# Patient Record
Sex: MALE | Race: WHITE | NOT HISPANIC OR LATINO | Employment: OTHER | URBAN - NONMETROPOLITAN AREA
[De-identification: names, ages, dates, MRNs, and addresses within clinical notes are randomized per-mention and may not be internally consistent; named-entity substitution may affect disease eponyms.]

---

## 2020-08-24 ENCOUNTER — OFFICE VISIT (OUTPATIENT)
Dept: SURGERY | Facility: CLINIC | Age: 80
End: 2020-08-24

## 2020-08-24 VITALS
BODY MASS INDEX: 22.68 KG/M2 | HEIGHT: 71 IN | SYSTOLIC BLOOD PRESSURE: 136 MMHG | WEIGHT: 162 LBS | DIASTOLIC BLOOD PRESSURE: 74 MMHG | TEMPERATURE: 98.3 F | HEART RATE: 60 BPM | RESPIRATION RATE: 16 BRPM

## 2020-08-24 DIAGNOSIS — R12 HEARTBURN: ICD-10-CM

## 2020-08-24 DIAGNOSIS — R13.12 OROPHARYNGEAL DYSPHAGIA: Primary | ICD-10-CM

## 2020-08-24 PROCEDURE — 99203 OFFICE O/P NEW LOW 30 MIN: CPT | Performed by: SURGERY

## 2020-08-24 RX ORDER — AZITHROMYCIN 500 MG/1
500 TABLET, FILM COATED ORAL DAILY
COMMUNITY
End: 2021-08-10

## 2020-08-24 RX ORDER — LEVOTHYROXINE SODIUM 0.05 MG/1
75 TABLET ORAL DAILY
COMMUNITY
End: 2021-08-30 | Stop reason: ALTCHOICE

## 2020-08-24 RX ORDER — OMEPRAZOLE 20 MG/1
40 CAPSULE, DELAYED RELEASE ORAL DAILY
COMMUNITY

## 2020-08-24 RX ORDER — ETHAMBUTOL HYDROCHLORIDE 400 MG/1
400 TABLET, FILM COATED ORAL DAILY
COMMUNITY
End: 2021-08-10

## 2020-08-24 RX ORDER — RIFAMPIN 300 MG/1
600 CAPSULE ORAL
COMMUNITY
End: 2021-08-10

## 2020-08-24 RX ORDER — ATORVASTATIN CALCIUM 20 MG/1
20 TABLET, FILM COATED ORAL DAILY
COMMUNITY

## 2020-08-24 RX ORDER — INDOMETHACIN 50 MG/1
50 CAPSULE ORAL 3 TIMES DAILY PRN
COMMUNITY
End: 2021-03-01

## 2020-08-24 RX ORDER — RAMIPRIL 5 MG/1
5 CAPSULE ORAL DAILY
COMMUNITY

## 2020-08-24 NOTE — H&P
Amol Brown 80 y.o. male presents @ the req of Dr. Todd for eval of diff swallowing solids since March 2020.    Chief Complaint   Patient presents with   • EGD     diff swallowing             HPI   Above-noted agree.  This very pleasant 80-year-old is here to discuss an EGD.  He is having difficulty swallowing.  He feels like there is a full sensation in his throat every time he swallows.  He does have issues with chronic heartburn as well.  He does not get sick to his stomach or vomit.  He moves his bowels daily.  He does have a history of colon polyps but his next colonoscopy is not due until October 2021.  He has no abdominal pain.  He has no chest pain or shortness of breath.  He does not smoke or use tobacco products.  He has no other complaints.      Review of Systems   All other systems reviewed and are negative.            Current Outpatient Medications:   •  atorvastatin (LIPITOR) 20 MG tablet, Take 20 mg by mouth Daily., Disp: , Rfl:   •  azithromycin (ZITHROMAX) 500 MG tablet, Take 500 mg by mouth Daily., Disp: , Rfl:   •  ethambutol (MYAMBUTOL) 400 MG tablet, Take 400 mg by mouth Daily., Disp: , Rfl:   •  indomethacin (INDOCIN) 50 MG capsule, Take 50 mg by mouth 3 (Three) Times a Day As Needed for Mild Pain ., Disp: , Rfl:   •  levothyroxine (SYNTHROID, LEVOTHROID) 50 MCG tablet, Take 50 mcg by mouth Daily., Disp: , Rfl:   •  omeprazole (priLOSEC) 20 MG capsule, Take 20 mg by mouth Daily., Disp: , Rfl:   •  ramipril (ALTACE) 5 MG capsule, Take 5 mg by mouth Daily., Disp: , Rfl:   •  rifAMPin (RIFADIN) 300 MG capsule, Take 600 mg by mouth., Disp: , Rfl:         No Known Allergies        Past Medical History:   Diagnosis Date   • Arthritis    • CAD (coronary artery disease)    • Colon polyps    • HLD (hyperlipidemia)    • Thoracic aortic aneurysm (CMS/HCC)    • Thyroid disease            Past Surgical History:   Procedure Laterality Date   • COLONOSCOPY     • CORONARY ANGIOPLASTY WITH STENT PLACEMENT   "   • ENDOSCOPY     • NECK SURGERY             Social History     Tobacco Use   • Smoking status: Former Smoker   • Smokeless tobacco: Never Used   Substance Use Topics   • Alcohol use: Not Currently   • Drug use: Not on file             There is no immunization history on file for this patient.        Physical Exam   Constitutional: He is oriented to person, place, and time. He appears well-developed and well-nourished.   HENT:   Head: Normocephalic and atraumatic.   Right Ear: External ear normal.   Left Ear: External ear normal.   Cardiovascular: Normal rate and regular rhythm.   Pulmonary/Chest: Effort normal and breath sounds normal.   Abdominal: Soft. Bowel sounds are normal.   Musculoskeletal: He exhibits no edema or deformity.   Neurological: He is alert and oriented to person, place, and time.   Skin: Skin is warm and dry.   Psychiatric: He has a normal mood and affect. His behavior is normal.   Nursing note and vitals reviewed.      Debilities/Disabilities Identified: None    Emotional Behavior: Appropriate      /74   Pulse 60   Temp 98.3 °F (36.8 °C)   Resp 16   Ht 180.3 cm (71\")   Wt 73.5 kg (162 lb)   BMI 22.59 kg/m²          Amol was seen today for egd.    Diagnoses and all orders for this visit:    Oropharyngeal dysphagia    Heartburn    We will get Amol scheduled for an EGD.  I discussed with the patient the benefits and risks of performing endoscopy.  Benefits and risks were not limited to but including bleeding, infection, perforation, complications of anesthesia, aspiration.  The patient appeared to understand and is willing to proceed.    Thank you for allowing me to participate in the care of this interesting patient.              "

## 2020-09-05 ENCOUNTER — APPOINTMENT (OUTPATIENT)
Dept: LAB | Facility: HOSPITAL | Age: 80
End: 2020-09-05

## 2020-09-11 ENCOUNTER — TRANSCRIBE ORDERS (OUTPATIENT)
Dept: ADMINISTRATIVE | Facility: HOSPITAL | Age: 80
End: 2020-09-11

## 2020-09-11 DIAGNOSIS — U07.1 COVID-19: Primary | ICD-10-CM

## 2020-09-12 ENCOUNTER — LAB (OUTPATIENT)
Dept: LAB | Facility: HOSPITAL | Age: 80
End: 2020-09-12

## 2020-09-12 DIAGNOSIS — U07.1 COVID-19: ICD-10-CM

## 2020-09-12 PROCEDURE — C9803 HOPD COVID-19 SPEC COLLECT: HCPCS

## 2020-09-12 PROCEDURE — U0004 COV-19 TEST NON-CDC HGH THRU: HCPCS

## 2020-09-14 ENCOUNTER — ANESTHESIA EVENT (OUTPATIENT)
Dept: PERIOP | Facility: HOSPITAL | Age: 80
End: 2020-09-14

## 2020-09-14 LAB — SARS-COV-2 RNA NOSE QL NAA+PROBE: NOT DETECTED

## 2020-09-15 ENCOUNTER — HOSPITAL ENCOUNTER (OUTPATIENT)
Facility: HOSPITAL | Age: 80
Setting detail: HOSPITAL OUTPATIENT SURGERY
Discharge: HOME | End: 2020-09-15
Attending: SURGERY | Admitting: SURGERY

## 2020-09-15 ENCOUNTER — ANESTHESIA (OUTPATIENT)
Dept: PERIOP | Facility: HOSPITAL | Age: 80
End: 2020-09-15

## 2020-09-15 VITALS
WEIGHT: 166.4 LBS | HEIGHT: 71 IN | TEMPERATURE: 98 F | HEART RATE: 56 BPM | BODY MASS INDEX: 23.3 KG/M2 | SYSTOLIC BLOOD PRESSURE: 128 MMHG | DIASTOLIC BLOOD PRESSURE: 73 MMHG | RESPIRATION RATE: 16 BRPM | OXYGEN SATURATION: 98 %

## 2020-09-15 DIAGNOSIS — R12 HEARTBURN: ICD-10-CM

## 2020-09-15 DIAGNOSIS — R13.12 OROPHARYNGEAL DYSPHAGIA: ICD-10-CM

## 2020-09-15 PROCEDURE — 43239 EGD BIOPSY SINGLE/MULTIPLE: CPT | Performed by: SURGERY

## 2020-09-15 PROCEDURE — 88342 IMHCHEM/IMCYTCHM 1ST ANTB: CPT | Performed by: SURGERY

## 2020-09-15 PROCEDURE — 25010000002 PROPOFOL 10 MG/ML EMULSION: Performed by: NURSE ANESTHETIST, CERTIFIED REGISTERED

## 2020-09-15 PROCEDURE — 88312 SPECIAL STAINS GROUP 1: CPT | Performed by: SURGERY

## 2020-09-15 PROCEDURE — S0260 H&P FOR SURGERY: HCPCS | Performed by: SURGERY

## 2020-09-15 PROCEDURE — 88305 TISSUE EXAM BY PATHOLOGIST: CPT | Performed by: SURGERY

## 2020-09-15 PROCEDURE — 87081 CULTURE SCREEN ONLY: CPT | Performed by: SURGERY

## 2020-09-15 RX ORDER — LIDOCAINE HYDROCHLORIDE 10 MG/ML
INJECTION, SOLUTION EPIDURAL; INFILTRATION; INTRACAUDAL; PERINEURAL AS NEEDED
Status: DISCONTINUED | OUTPATIENT
Start: 2020-09-15 | End: 2020-09-15 | Stop reason: SURG

## 2020-09-15 RX ORDER — SODIUM CHLORIDE 0.9 % (FLUSH) 0.9 %
10 SYRINGE (ML) INJECTION AS NEEDED
Status: DISCONTINUED | OUTPATIENT
Start: 2020-09-15 | End: 2020-09-15 | Stop reason: HOSPADM

## 2020-09-15 RX ORDER — SODIUM CHLORIDE 0.9 % (FLUSH) 0.9 %
10 SYRINGE (ML) INJECTION EVERY 12 HOURS SCHEDULED
Status: DISCONTINUED | OUTPATIENT
Start: 2020-09-15 | End: 2020-09-15 | Stop reason: HOSPADM

## 2020-09-15 RX ORDER — PROPOFOL 10 MG/ML
VIAL (ML) INTRAVENOUS CONTINUOUS PRN
Status: DISCONTINUED | OUTPATIENT
Start: 2020-09-15 | End: 2020-09-15 | Stop reason: SURG

## 2020-09-15 RX ORDER — ASPIRIN 81 MG/1
81 TABLET ORAL DAILY
COMMUNITY

## 2020-09-15 RX ORDER — SUCRALFATE 1 G/1
1 TABLET ORAL 4 TIMES DAILY
Qty: 120 TABLET | Refills: 1 | Status: SHIPPED | OUTPATIENT
Start: 2020-09-15 | End: 2021-03-01

## 2020-09-15 RX ORDER — LIDOCAINE HYDROCHLORIDE 10 MG/ML
0.5 INJECTION, SOLUTION EPIDURAL; INFILTRATION; INTRACAUDAL; PERINEURAL ONCE AS NEEDED
Status: COMPLETED | OUTPATIENT
Start: 2020-09-15 | End: 2020-09-15

## 2020-09-15 RX ORDER — PROPOFOL 10 MG/ML
VIAL (ML) INTRAVENOUS AS NEEDED
Status: DISCONTINUED | OUTPATIENT
Start: 2020-09-15 | End: 2020-09-15 | Stop reason: SURG

## 2020-09-15 RX ORDER — SODIUM CHLORIDE 9 MG/ML
40 INJECTION, SOLUTION INTRAVENOUS AS NEEDED
Status: DISCONTINUED | OUTPATIENT
Start: 2020-09-15 | End: 2020-09-15 | Stop reason: HOSPADM

## 2020-09-15 RX ORDER — SODIUM CHLORIDE, SODIUM LACTATE, POTASSIUM CHLORIDE, CALCIUM CHLORIDE 600; 310; 30; 20 MG/100ML; MG/100ML; MG/100ML; MG/100ML
9 INJECTION, SOLUTION INTRAVENOUS CONTINUOUS PRN
Status: DISCONTINUED | OUTPATIENT
Start: 2020-09-15 | End: 2020-09-15 | Stop reason: HOSPADM

## 2020-09-15 RX ADMIN — PROPOFOL 30 MG: 10 INJECTION, EMULSION INTRAVENOUS at 09:52

## 2020-09-15 RX ADMIN — PROPOFOL 50 MG: 10 INJECTION, EMULSION INTRAVENOUS at 09:48

## 2020-09-15 RX ADMIN — SODIUM CHLORIDE, POTASSIUM CHLORIDE, SODIUM LACTATE AND CALCIUM CHLORIDE: 600; 310; 30; 20 INJECTION, SOLUTION INTRAVENOUS at 09:42

## 2020-09-15 RX ADMIN — SODIUM CHLORIDE, POTASSIUM CHLORIDE, SODIUM LACTATE AND CALCIUM CHLORIDE 9 ML/HR: 600; 310; 30; 20 INJECTION, SOLUTION INTRAVENOUS at 09:15

## 2020-09-15 RX ADMIN — PROPOFOL 50 MG: 10 INJECTION, EMULSION INTRAVENOUS at 09:45

## 2020-09-15 RX ADMIN — LIDOCAINE HYDROCHLORIDE 50 MG: 10 INJECTION, SOLUTION EPIDURAL; INFILTRATION; INTRACAUDAL; PERINEURAL at 09:44

## 2020-09-15 RX ADMIN — PROPOFOL 50 MCG/KG/MIN: 10 INJECTION, EMULSION INTRAVENOUS at 09:45

## 2020-09-15 RX ADMIN — LIDOCAINE HYDROCHLORIDE 0.5 ML: 10 INJECTION, SOLUTION EPIDURAL; INFILTRATION; INTRACAUDAL; PERINEURAL at 09:15

## 2020-09-15 NOTE — ANESTHESIA POSTPROCEDURE EVALUATION
Patient: Amol Brown    Procedure Summary     Date: 09/15/20 Room / Location: [facility] ENDOSCOPY 1 /  [facility]    Anesthesia Start: 0942 Anesthesia Stop: 0956    Procedure: Esophagogastroduodenoscopy with biopsies, polypectomy (N/A Esophagus) Diagnosis:       Oropharyngeal dysphagia      Heartburn      (Oropharyngeal dysphagia [R13.12])      (Heartburn [R12])    Surgeon: Ada Hendricks DO Provider: Rahat Monreal CRNA    Anesthesia Type: MAC ASA Status: 2          Anesthesia Type: MAC    Vitals  Vitals Value Taken Time   /67 09/15/20 1000   Temp     Pulse 52 09/15/20 1000   Resp 12 09/15/20 1000   SpO2 97 % 09/15/20 1000           Post Anesthesia Care and Evaluation    Patient location during evaluation: bedside  Patient participation: complete - patient participated  Level of consciousness: awake and alert  Pain score: 0  Pain management: adequate  Airway patency: patent  Anesthetic complications: No anesthetic complications  PONV Status: none  Cardiovascular status: acceptable  Respiratory status: acceptable  Hydration status: acceptable  No anesthesia care post op

## 2020-09-15 NOTE — OP NOTE
EGD Procedure Note    Pre-operative Diagnosis:  dysphagia    Post-operative Diagnosis: Esophagitis, gastritis, duodenitis, gastric polyps    Procedure:  EGD with biopsies with cold forceps    Surgeon: Eladio    Estimated Blood Loss: Minimal    Complications: None    Anesthetic: Rahat Huerta CRNA    Indications: See preoperative diagnosis    Findings/Treatments:    Esophagitis-biopsies of GE junction with cold forceps  Gastritis-biopsy for H. pylori with cold forceps  Gastric polyps-biopsied with cold forceps  Duodenitis-biopsy with cold forceps    Recommendations:  -Await pathology.      Technique:    After discussing the benefits and risks of an EGD, benefits and risks not limited to but including:  Bleeding, infection, perforation, aspiration; informed consent was signed.  The patient was taken into the endoscopy suite at [facility] and placed in the left lateral decubitus position.  MAC anesthesia was induced under appropriate monitoring.  Bite block was placed and the gastroscope was inserted thru such and advanced under direct vision to second portion of the duodenum.  A careful inspection was made as the gastroscope was withdrawn, including a retroflexed view of the proximal stomach; findings and interventions are described below.  If biopsies were taken, this was done with the cold biopsy forceps.    Ada Hendricks D.O.

## 2020-09-15 NOTE — ANESTHESIA PREPROCEDURE EVALUATION
Anesthesia Evaluation     Patient summary reviewed and Nursing notes reviewed   no history of anesthetic complications:  NPO Solid Status: > 8 hours  NPO Liquid Status: > 8 hours           Airway   Mallampati: II  Dental - normal exam         Pulmonary - normal exam   (+) a smoker Former,   Cardiovascular - normal exam  Exercise tolerance: good (4-7 METS)    ECG reviewed  Rhythm: regular  Rate: normal    (+) hypertension well controlled, CAD (stent 20yrs ago), hyperlipidemia,       Neuro/Psych- negative ROS  GI/Hepatic/Renal/Endo    (+)   thyroid problem hypothyroidism    Musculoskeletal     (+) back pain (laminectomy 2014),   Abdominal    Substance History - negative use     OB/GYN negative ob/gyn ROS         Other   arthritis,                    Anesthesia Plan    ASA 2     MAC     intravenous induction     Anesthetic plan, all risks, benefits, and alternatives have been provided, discussed and informed consent has been obtained with: patient.  Use of blood products discussed with patient  Consented to blood products.

## 2020-09-16 LAB — UREASE TISS QL: NEGATIVE

## 2020-09-17 LAB
CYTO UR: NORMAL
LAB AP CASE REPORT: NORMAL
PATH REPORT.FINAL DX SPEC: NORMAL
PATH REPORT.GROSS SPEC: NORMAL

## 2020-09-21 ENCOUNTER — OFFICE VISIT (OUTPATIENT)
Dept: SURGERY | Facility: CLINIC | Age: 80
End: 2020-09-21

## 2020-09-21 VITALS — TEMPERATURE: 97.3 F

## 2020-09-21 DIAGNOSIS — K21.00 GASTROESOPHAGEAL REFLUX DISEASE WITH ESOPHAGITIS: Primary | ICD-10-CM

## 2020-09-21 PROCEDURE — 99213 OFFICE O/P EST LOW 20 MIN: CPT | Performed by: SURGERY

## 2020-09-21 NOTE — PROGRESS NOTES
Amol Brown 80 y.o. male presents for PO FU EGD.  Pt states he feels about the same as before the proc.        HPI   Above-noted agree.  Amol is doing well.  His symptoms have really not changed.  He did have pretty severe reflux.  He does not want to take the Carafate right now because he is taking a lot of antibiotics for a lung infection and he is concerned that the Carafate will affect the absorption of those medications.  He has no abdominal pain.  He has no other symptoms.  He does not smoke or use tobacco products.  We reviewed his pictures and all of his pathology.      Review of Systems        Past Medical History:   Diagnosis Date   • Arthritis    • CAD (coronary artery disease)    • Colon polyps    • HLD (hyperlipidemia)    • Thoracic aortic aneurysm (CMS/HCC)    • Thyroid disease            Past Surgical History:   Procedure Laterality Date   • COLONOSCOPY     • CORONARY ANGIOPLASTY WITH STENT PLACEMENT     • ENDOSCOPY     • ENDOSCOPY N/A 9/15/2020    Procedure: Esophagogastroduodenoscopy with biopsies, polypectomy;  Surgeon: Ada Hendricks DO;  Location: [facility];  Service: Gastroenterology;  Laterality: N/A;  AUNDREA test  reflux esophagitis  mild gastritis  gastric polyp  duodenitis  GE junction biopsy  duodenum biopsy   • NECK SURGERY             Physical Exam  Vitals signs and nursing note reviewed.   Constitutional:       Appearance: Normal appearance.   Musculoskeletal:         General: No swelling or tenderness.   Skin:     General: Skin is warm and dry.   Neurological:      General: No focal deficit present.      Mental Status: He is alert and oriented to person, place, and time.   Psychiatric:         Mood and Affect: Mood normal.         Behavior: Behavior normal.             Temp 97.3 °F (36.3 °C)         Amol was seen today for post-op follow-up.    Diagnoses and all orders for this visit:    Gastroesophageal reflux disease with esophagitis    We discussed lifestyle changes and foods  to avoid for his reflux.  He is very knowledgeable about heartburn and he has done extensive research on his symptoms.  We did discuss that a referral to ENT but he declined at this time.  He may return to clinic anytime as needed.    Thank you for allowing me to participate in the care of this interesting patient.

## 2021-03-01 ENCOUNTER — OFFICE VISIT (OUTPATIENT)
Dept: SURGERY | Facility: CLINIC | Age: 81
End: 2021-03-01

## 2021-03-01 VITALS
RESPIRATION RATE: 16 BRPM | DIASTOLIC BLOOD PRESSURE: 76 MMHG | TEMPERATURE: 96.9 F | HEIGHT: 71 IN | HEART RATE: 72 BPM | SYSTOLIC BLOOD PRESSURE: 126 MMHG | WEIGHT: 166 LBS | BODY MASS INDEX: 23.24 KG/M2

## 2021-03-01 DIAGNOSIS — Z12.11 COLON CANCER SCREENING: Primary | ICD-10-CM

## 2021-03-01 PROBLEM — R13.12 OROPHARYNGEAL DYSPHAGIA: Status: RESOLVED | Noted: 2020-08-24 | Resolved: 2021-03-01

## 2021-03-01 PROBLEM — R12 HEARTBURN: Status: RESOLVED | Noted: 2020-08-24 | Resolved: 2021-03-01

## 2021-03-01 PROCEDURE — S0260 H&P FOR SURGERY: HCPCS | Performed by: SURGERY

## 2021-03-01 RX ORDER — PREDNISONE 1 MG/1
5 TABLET ORAL DAILY
COMMUNITY
End: 2021-08-10

## 2021-03-01 NOTE — H&P
Amol Brown 80 y.o. male presents as a self ref to discuss c-scope.  Pt states Dr. Todd told him he has a low RBC count.  Pt reports he is currently taking 3 antibiotics for a lung infection X 18 mos.   Chief Complaint   Patient presents with   • Anemia             HPI     Above-noted and agree.  Amol is known to my service.  I did an EGD on him several months ago for dysphagia.  All of his symptoms have since resolved.  He is here to discuss a colonoscopy.  He was told he has a low red blood cell count.  He did have a colonoscopy approximately 10 years ago that was normal.  He moves his bowels every day and does not see blood when he goes.  He is tolerating a regular diet without nausea or vomiting.  He has no chest pain or shortness of breath.  He has no complaints.    Review of Systems   All other systems reviewed and are negative.            Current Outpatient Medications:   •  predniSONE (DELTASONE) 5 MG tablet, Take 5 mg by mouth Daily., Disp: , Rfl:   •  ALLOPURINOL PO, Take  by mouth Daily., Disp: , Rfl:   •  aspirin 81 MG EC tablet, Take 81 mg by mouth Daily., Disp: , Rfl:   •  atorvastatin (LIPITOR) 20 MG tablet, Take 20 mg by mouth Daily., Disp: , Rfl:   •  azithromycin (ZITHROMAX) 500 MG tablet, Take 500 mg by mouth Daily., Disp: , Rfl:   •  ethambutol (MYAMBUTOL) 400 MG tablet, Take 400 mg by mouth Daily., Disp: , Rfl:   •  levothyroxine (SYNTHROID, LEVOTHROID) 50 MCG tablet, Take 50 mcg by mouth Daily., Disp: , Rfl:   •  omeprazole (priLOSEC) 20 MG capsule, Take 20 mg by mouth Daily., Disp: , Rfl:   •  ramipril (ALTACE) 5 MG capsule, Take 5 mg by mouth Daily., Disp: , Rfl:   •  rifAMPin (RIFADIN) 300 MG capsule, Take 600 mg by mouth., Disp: , Rfl:         No Known Allergies        Past Medical History:   Diagnosis Date   • Arthritis    • CAD (coronary artery disease)    • Colon polyps    • HLD (hyperlipidemia)    • Thoracic aortic aneurysm (CMS/HCC)    • Thyroid disease            Past Surgical  "History:   Procedure Laterality Date   • COLONOSCOPY     • CORONARY ANGIOPLASTY WITH STENT PLACEMENT     • ENDOSCOPY     • ENDOSCOPY N/A 9/15/2020    Procedure: Esophagogastroduodenoscopy with biopsies, polypectomy;  Surgeon: Ada Hendricks DO;  Location: [facility];  Service: Gastroenterology;  Laterality: N/A;  AUNDREA test  reflux esophagitis  mild gastritis  gastric polyp  duodenitis  GE junction biopsy  duodenum biopsy   • NECK SURGERY             Social History     Tobacco Use   • Smoking status: Former Smoker     Years: 2.00   • Smokeless tobacco: Never Used   • Tobacco comment: as a child   Substance Use Topics   • Alcohol use: Not Currently     Comment: quit oct 2019   • Drug use: Never             There is no immunization history on file for this patient.        Physical Exam  Vitals signs and nursing note reviewed.   Constitutional:       Appearance: Normal appearance.   HENT:      Head: Normocephalic and atraumatic.   Cardiovascular:      Rate and Rhythm: Normal rate and regular rhythm.   Pulmonary:      Effort: Pulmonary effort is normal.      Breath sounds: Normal breath sounds.   Abdominal:      General: Bowel sounds are normal.      Palpations: Abdomen is soft.   Musculoskeletal:         General: No swelling or tenderness.   Skin:     General: Skin is warm and dry.   Neurological:      General: No focal deficit present.      Mental Status: He is alert and oriented to person, place, and time.   Psychiatric:         Mood and Affect: Mood normal.         Behavior: Behavior normal.         Debilities/Disabilities Identified: None    Emotional Behavior: Appropriate      /76   Pulse 72   Temp 96.9 °F (36.1 °C)   Resp 16   Ht 180.3 cm (71\")   Wt 75.3 kg (166 lb)   BMI 23.15 kg/m²         Diagnoses and all orders for this visit:    1. Colon cancer screening (Primary)    I discussed with Amol the benefits and risks of performing a colonoscopy.  Benefits and risks were not limited to but " including bleeding, infection, perforation, complications of anesthesia, aspiration.  He appeared to understand and is willing to proceed.    Thank you for allowing me to participate in the care of this interesting patient.

## 2021-03-01 NOTE — PROGRESS NOTES
Amol Brown 80 y.o. male presents as a self ref to discuss c-scope.  Pt states Dr. Todd told him he has a low RBC count.  Pt reports he is currently taking 3 antibiotics for a lung infection X 18 mos.   Chief Complaint   Patient presents with   • Anemia             HPI     Above-noted and agree.  Amol is known to my service.  I did an EGD on him several months ago for dysphagia.  All of his symptoms have since resolved.  He is here to discuss a colonoscopy.  He was told he has a low red blood cell count.  He did have a colonoscopy approximately 10 years ago that was normal.  He moves his bowels every day and does not see blood when he goes.  He is tolerating a regular diet without nausea or vomiting.  He has no chest pain or shortness of breath.  He has no complaints.    Review of Systems   All other systems reviewed and are negative.            Current Outpatient Medications:   •  predniSONE (DELTASONE) 5 MG tablet, Take 5 mg by mouth Daily., Disp: , Rfl:   •  ALLOPURINOL PO, Take  by mouth Daily., Disp: , Rfl:   •  aspirin 81 MG EC tablet, Take 81 mg by mouth Daily., Disp: , Rfl:   •  atorvastatin (LIPITOR) 20 MG tablet, Take 20 mg by mouth Daily., Disp: , Rfl:   •  azithromycin (ZITHROMAX) 500 MG tablet, Take 500 mg by mouth Daily., Disp: , Rfl:   •  ethambutol (MYAMBUTOL) 400 MG tablet, Take 400 mg by mouth Daily., Disp: , Rfl:   •  levothyroxine (SYNTHROID, LEVOTHROID) 50 MCG tablet, Take 50 mcg by mouth Daily., Disp: , Rfl:   •  omeprazole (priLOSEC) 20 MG capsule, Take 20 mg by mouth Daily., Disp: , Rfl:   •  ramipril (ALTACE) 5 MG capsule, Take 5 mg by mouth Daily., Disp: , Rfl:   •  rifAMPin (RIFADIN) 300 MG capsule, Take 600 mg by mouth., Disp: , Rfl:         No Known Allergies        Past Medical History:   Diagnosis Date   • Arthritis    • CAD (coronary artery disease)    • Colon polyps    • HLD (hyperlipidemia)    • Thoracic aortic aneurysm (CMS/HCC)    • Thyroid disease            Past Surgical  "History:   Procedure Laterality Date   • COLONOSCOPY     • CORONARY ANGIOPLASTY WITH STENT PLACEMENT     • ENDOSCOPY     • ENDOSCOPY N/A 9/15/2020    Procedure: Esophagogastroduodenoscopy with biopsies, polypectomy;  Surgeon: Ada Hendricks DO;  Location: [facility];  Service: Gastroenterology;  Laterality: N/A;  AUNDREA test  reflux esophagitis  mild gastritis  gastric polyp  duodenitis  GE junction biopsy  duodenum biopsy   • NECK SURGERY             Social History     Tobacco Use   • Smoking status: Former Smoker     Years: 2.00   • Smokeless tobacco: Never Used   • Tobacco comment: as a child   Substance Use Topics   • Alcohol use: Not Currently     Comment: quit oct 2019   • Drug use: Never             There is no immunization history on file for this patient.        Physical Exam  Vitals signs and nursing note reviewed.   Constitutional:       Appearance: Normal appearance.   HENT:      Head: Normocephalic and atraumatic.   Cardiovascular:      Rate and Rhythm: Normal rate and regular rhythm.   Pulmonary:      Effort: Pulmonary effort is normal.      Breath sounds: Normal breath sounds.   Abdominal:      General: Bowel sounds are normal.      Palpations: Abdomen is soft.   Musculoskeletal:         General: No swelling or tenderness.   Skin:     General: Skin is warm and dry.   Neurological:      General: No focal deficit present.      Mental Status: He is alert and oriented to person, place, and time.   Psychiatric:         Mood and Affect: Mood normal.         Behavior: Behavior normal.         Debilities/Disabilities Identified: None    Emotional Behavior: Appropriate      /76   Pulse 72   Temp 96.9 °F (36.1 °C)   Resp 16   Ht 180.3 cm (71\")   Wt 75.3 kg (166 lb)   BMI 23.15 kg/m²         Diagnoses and all orders for this visit:    1. Colon cancer screening (Primary)    I discussed with Amol the benefits and risks of performing a colonoscopy.  Benefits and risks were not limited to but " including bleeding, infection, perforation, complications of anesthesia, aspiration.  He appeared to understand and is willing to proceed.    Thank you for allowing me to participate in the care of this interesting patient.

## 2021-03-19 ENCOUNTER — TRANSCRIBE ORDERS (OUTPATIENT)
Dept: ADMINISTRATIVE | Facility: HOSPITAL | Age: 81
End: 2021-03-19

## 2021-03-19 DIAGNOSIS — U07.1 COVID-19: Primary | ICD-10-CM

## 2021-04-03 ENCOUNTER — LAB (OUTPATIENT)
Dept: LAB | Facility: HOSPITAL | Age: 81
End: 2021-04-03

## 2021-04-03 DIAGNOSIS — U07.1 COVID-19: ICD-10-CM

## 2021-04-03 LAB — SARS-COV-2 RNA PNL SPEC NAA+PROBE: NOT DETECTED

## 2021-04-03 PROCEDURE — 87635 SARS-COV-2 COVID-19 AMP PRB: CPT | Performed by: OBSTETRICS & GYNECOLOGY

## 2021-04-03 PROCEDURE — C9803 HOPD COVID-19 SPEC COLLECT: HCPCS

## 2021-04-05 ENCOUNTER — ANESTHESIA EVENT (OUTPATIENT)
Dept: PERIOP | Facility: HOSPITAL | Age: 81
End: 2021-04-05

## 2021-04-06 ENCOUNTER — ANESTHESIA (OUTPATIENT)
Dept: PERIOP | Facility: HOSPITAL | Age: 81
End: 2021-04-06

## 2021-04-06 ENCOUNTER — HOSPITAL ENCOUNTER (OUTPATIENT)
Facility: HOSPITAL | Age: 81
Setting detail: HOSPITAL OUTPATIENT SURGERY
Discharge: HOME | End: 2021-04-06
Attending: SURGERY | Admitting: SURGERY

## 2021-04-06 VITALS
DIASTOLIC BLOOD PRESSURE: 77 MMHG | HEIGHT: 71 IN | WEIGHT: 167 LBS | HEART RATE: 61 BPM | TEMPERATURE: 97.7 F | BODY MASS INDEX: 23.38 KG/M2 | SYSTOLIC BLOOD PRESSURE: 136 MMHG | RESPIRATION RATE: 16 BRPM | OXYGEN SATURATION: 95 %

## 2021-04-06 DIAGNOSIS — Z12.11 COLON CANCER SCREENING: ICD-10-CM

## 2021-04-06 PROCEDURE — 25010000002 PROPOFOL 10 MG/ML EMULSION: Performed by: NURSE ANESTHETIST, CERTIFIED REGISTERED

## 2021-04-06 PROCEDURE — 45380 COLONOSCOPY AND BIOPSY: CPT | Performed by: SURGERY

## 2021-04-06 PROCEDURE — 88305 TISSUE EXAM BY PATHOLOGIST: CPT | Performed by: SURGERY

## 2021-04-06 RX ORDER — SODIUM CHLORIDE 0.9 % (FLUSH) 0.9 %
10 SYRINGE (ML) INJECTION AS NEEDED
Status: DISCONTINUED | OUTPATIENT
Start: 2021-04-06 | End: 2021-04-06 | Stop reason: HOSPADM

## 2021-04-06 RX ORDER — LIDOCAINE HYDROCHLORIDE 10 MG/ML
0.5 INJECTION, SOLUTION EPIDURAL; INFILTRATION; INTRACAUDAL; PERINEURAL ONCE AS NEEDED
Status: DISCONTINUED | OUTPATIENT
Start: 2021-04-06 | End: 2021-04-06 | Stop reason: HOSPADM

## 2021-04-06 RX ORDER — PROPOFOL 10 MG/ML
VIAL (ML) INTRAVENOUS AS NEEDED
Status: DISCONTINUED | OUTPATIENT
Start: 2021-04-06 | End: 2021-04-06 | Stop reason: SURG

## 2021-04-06 RX ORDER — GLYCOPYRROLATE 0.2 MG/ML
INJECTION INTRAMUSCULAR; INTRAVENOUS AS NEEDED
Status: DISCONTINUED | OUTPATIENT
Start: 2021-04-06 | End: 2021-04-06 | Stop reason: SURG

## 2021-04-06 RX ORDER — SODIUM CHLORIDE 9 MG/ML
40 INJECTION, SOLUTION INTRAVENOUS AS NEEDED
Status: DISCONTINUED | OUTPATIENT
Start: 2021-04-06 | End: 2021-04-06 | Stop reason: HOSPADM

## 2021-04-06 RX ORDER — SODIUM CHLORIDE 0.9 % (FLUSH) 0.9 %
10 SYRINGE (ML) INJECTION EVERY 12 HOURS SCHEDULED
Status: DISCONTINUED | OUTPATIENT
Start: 2021-04-06 | End: 2021-04-06 | Stop reason: HOSPADM

## 2021-04-06 RX ORDER — LIDOCAINE HYDROCHLORIDE 20 MG/ML
INJECTION, SOLUTION INFILTRATION; PERINEURAL AS NEEDED
Status: DISCONTINUED | OUTPATIENT
Start: 2021-04-06 | End: 2021-04-06 | Stop reason: SURG

## 2021-04-06 RX ORDER — SODIUM CHLORIDE, SODIUM LACTATE, POTASSIUM CHLORIDE, CALCIUM CHLORIDE 600; 310; 30; 20 MG/100ML; MG/100ML; MG/100ML; MG/100ML
9 INJECTION, SOLUTION INTRAVENOUS CONTINUOUS PRN
Status: DISCONTINUED | OUTPATIENT
Start: 2021-04-06 | End: 2021-04-06 | Stop reason: HOSPADM

## 2021-04-06 RX ADMIN — LIDOCAINE HYDROCHLORIDE 100 MG: 20 INJECTION, SOLUTION INFILTRATION; PERINEURAL at 08:37

## 2021-04-06 RX ADMIN — PROPOFOL 50 MG: 10 INJECTION, EMULSION INTRAVENOUS at 09:00

## 2021-04-06 RX ADMIN — PROPOFOL 50 MG: 10 INJECTION, EMULSION INTRAVENOUS at 09:05

## 2021-04-06 RX ADMIN — PROPOFOL 50 MG: 10 INJECTION, EMULSION INTRAVENOUS at 08:43

## 2021-04-06 RX ADMIN — PROPOFOL 50 MG: 10 INJECTION, EMULSION INTRAVENOUS at 08:40

## 2021-04-06 RX ADMIN — SODIUM CHLORIDE, POTASSIUM CHLORIDE, SODIUM LACTATE AND CALCIUM CHLORIDE 9 ML/HR: 600; 310; 30; 20 INJECTION, SOLUTION INTRAVENOUS at 07:19

## 2021-04-06 RX ADMIN — PROPOFOL 50 MG: 10 INJECTION, EMULSION INTRAVENOUS at 08:58

## 2021-04-06 RX ADMIN — PROPOFOL 50 MG: 10 INJECTION, EMULSION INTRAVENOUS at 08:55

## 2021-04-06 RX ADMIN — PROPOFOL 50 MG: 10 INJECTION, EMULSION INTRAVENOUS at 08:52

## 2021-04-06 RX ADMIN — PROPOFOL 50 MG: 10 INJECTION, EMULSION INTRAVENOUS at 08:49

## 2021-04-06 RX ADMIN — GLYCOPYRROLATE 0.1 MG: 0.2 INJECTION INTRAMUSCULAR; INTRAVENOUS at 08:37

## 2021-04-06 RX ADMIN — PROPOFOL 50 MG: 10 INJECTION, EMULSION INTRAVENOUS at 08:46

## 2021-04-06 RX ADMIN — PROPOFOL 50 MG: 10 INJECTION, EMULSION INTRAVENOUS at 08:37

## 2021-04-06 NOTE — ANESTHESIA POSTPROCEDURE EVALUATION
Patient: Amol Brown    Procedure Summary     Date: 04/06/21 Room / Location: [facility] ENDOSCOPY 1 /  [facility]    Anesthesia Start: 0834 Anesthesia Stop: 0913    Procedure: Colonoscopy with polypectomy (N/A ) Diagnosis:       Colon cancer screening      (Colon cancer screening [Z12.11])    Surgeons: Ada Hendricks DO Provider: Luis Felipe Lane CRNA    Anesthesia Type: MAC ASA Status: 2          Anesthesia Type: MAC    Vitals  No vitals data found for the desired time range.          Post Anesthesia Care and Evaluation    Patient location during evaluation: PHASE II  Patient participation: complete - patient participated  Level of consciousness: awake  Pain management: adequate  Airway patency: patent  Anesthetic complications: No anesthetic complications  PONV Status: none  Cardiovascular status: acceptable  Respiratory status: acceptable  Hydration status: acceptable

## 2021-04-06 NOTE — ANESTHESIA PREPROCEDURE EVALUATION
Anesthesia Evaluation     Patient summary reviewed and Nursing notes reviewed   no history of anesthetic complications:  NPO Solid Status: > 8 hours  NPO Liquid Status: > 8 hours           Airway   Mallampati: II  TM distance: >3 FB  Neck ROM: full  No difficulty expected  Dental - normal exam     Pulmonary - normal exam     ROS comment: Mycobacterium   Cardiovascular - normal exam    ECG reviewed    (+) CAD, hyperlipidemia,       Neuro/Psych- negative ROS  GI/Hepatic/Renal/Endo    (+)  GERD well controlled,  thyroid problem hypothyroidism    Musculoskeletal     Abdominal  - normal exam   Substance History - negative use     OB/GYN negative ob/gyn ROS         Other   arthritis,                    Anesthesia Plan    ASA 2     MAC   total IV anesthesia  intravenous induction     Anesthetic plan, all risks, benefits, and alternatives have been provided, discussed and informed consent has been obtained with: patient.  Use of blood products discussed with patient  Consented to blood products.

## 2021-04-06 NOTE — H&P
Amol Brown 80 y.o. male presents as a self ref to discuss c-scope.  Pt states Dr. Todd told him he has a low RBC count.  Pt reports he is currently taking 3 antibiotics for a lung infection X 18 mos.   Chief Complaint   Patient presents with   • Anemia             HPI     Above-noted and agree.  Amol is known to my service.  I did an EGD on him several months ago for dysphagia.  All of his symptoms have since resolved.  He is here to discuss a colonoscopy.  He was told he has a low red blood cell count.  He did have a colonoscopy approximately 10 years ago that was normal.  He moves his bowels every day and does not see blood when he goes.  He is tolerating a regular diet without nausea or vomiting.  He has no chest pain or shortness of breath.  He has no complaints.    Review of Systems   All other systems reviewed and are negative.            Current Outpatient Medications:   •  predniSONE (DELTASONE) 5 MG tablet, Take 5 mg by mouth Daily., Disp: , Rfl:   •  ALLOPURINOL PO, Take  by mouth Daily., Disp: , Rfl:   •  aspirin 81 MG EC tablet, Take 81 mg by mouth Daily., Disp: , Rfl:   •  atorvastatin (LIPITOR) 20 MG tablet, Take 20 mg by mouth Daily., Disp: , Rfl:   •  azithromycin (ZITHROMAX) 500 MG tablet, Take 500 mg by mouth Daily., Disp: , Rfl:   •  ethambutol (MYAMBUTOL) 400 MG tablet, Take 400 mg by mouth Daily., Disp: , Rfl:   •  levothyroxine (SYNTHROID, LEVOTHROID) 50 MCG tablet, Take 50 mcg by mouth Daily., Disp: , Rfl:   •  omeprazole (priLOSEC) 20 MG capsule, Take 20 mg by mouth Daily., Disp: , Rfl:   •  ramipril (ALTACE) 5 MG capsule, Take 5 mg by mouth Daily., Disp: , Rfl:   •  rifAMPin (RIFADIN) 300 MG capsule, Take 600 mg by mouth., Disp: , Rfl:         No Known Allergies        Past Medical History:   Diagnosis Date   • Arthritis    • CAD (coronary artery disease)    • Colon polyps    • HLD (hyperlipidemia)    • Thoracic aortic aneurysm (CMS/HCC)    • Thyroid disease            Past Surgical  "History:   Procedure Laterality Date   • COLONOSCOPY     • CORONARY ANGIOPLASTY WITH STENT PLACEMENT     • ENDOSCOPY     • ENDOSCOPY N/A 9/15/2020    Procedure: Esophagogastroduodenoscopy with biopsies, polypectomy;  Surgeon: Ada Hendricks DO;  Location: [facility];  Service: Gastroenterology;  Laterality: N/A;  AUNDREA test  reflux esophagitis  mild gastritis  gastric polyp  duodenitis  GE junction biopsy  duodenum biopsy   • NECK SURGERY             Social History     Tobacco Use   • Smoking status: Former Smoker     Years: 2.00   • Smokeless tobacco: Never Used   • Tobacco comment: as a child   Substance Use Topics   • Alcohol use: Not Currently     Comment: quit oct 2019   • Drug use: Never             There is no immunization history on file for this patient.        Physical Exam  Vitals signs and nursing note reviewed.   Constitutional:       Appearance: Normal appearance.   HENT:      Head: Normocephalic and atraumatic.   Cardiovascular:      Rate and Rhythm: Normal rate and regular rhythm.   Pulmonary:      Effort: Pulmonary effort is normal.      Breath sounds: Normal breath sounds.   Abdominal:      General: Bowel sounds are normal.      Palpations: Abdomen is soft.   Musculoskeletal:         General: No swelling or tenderness.   Skin:     General: Skin is warm and dry.   Neurological:      General: No focal deficit present.      Mental Status: He is alert and oriented to person, place, and time.   Psychiatric:         Mood and Affect: Mood normal.         Behavior: Behavior normal.         Debilities/Disabilities Identified: None    Emotional Behavior: Appropriate      /76   Pulse 72   Temp 96.9 °F (36.1 °C)   Resp 16   Ht 180.3 cm (71\")   Wt 75.3 kg (166 lb)   BMI 23.15 kg/m²         Diagnoses and all orders for this visit:    1. Colon cancer screening (Primary)    I discussed with Amol the benefits and risks of performing a colonoscopy.  Benefits and risks were not limited to but " including bleeding, infection, perforation, complications of anesthesia, aspiration.  He appeared to understand and is willing to proceed.    Thank you for allowing me to participate in the care of this interesting patient.

## 2021-04-06 NOTE — OP NOTE
Colonoscopy Procedure Note      Pre-operative Diagnosis: Colon cancer screening    Post-operative Diagnosis: Sigmoid colon polyps, rectal polyps    Procedure: Colonoscopy with polypectomy using cold forceps    Surgeon: Eladio    Anesthetic: MAC     Estimated Blood Loss: Minimal    Complications: None    Indications: Colon cancer screening    Findings/Treatments:   Sigmoid colon polyps x2-removed with cold forceps  Rectal polyps x3-removed with cold forceps       Scope Withdrawal Time:  >6 minutes      Recommendations: Await pathology      Procedure Details     After discussing the benefits and risks of the procedure with the patient, not limited to but including:  Bleeding, infection, perforation, aspiration; informed consent was signed.  The patient was taken into the endoscopy room at [facility] and placed in the left lateral decubitus position.  MAC anesthesia was given with appropriate cardiopulmonary monitoring.  A rectal exam was performed.  Sphincter tone was normal.  The colonoscope was then inserted and carefully advanced to the cecum while visualizing the mucosa.  The cecum was identified by the ileocecal valve and the orifice of the appendix.  Once in the cecum, the scope was slowly withdrawn while carefully evaluating the mucosa and deflating air.  There were 2 small polyps in the sigmoid colon that removed cold forceps.  There were 3 rectal polyps that removed cold forceps.  The prep was poor therefore visualization was difficult in some areas.  Once in the rectum the scope was retroflexed straightened and removed and Amol was taken to the recovery area in stable postoperative condition having tolerated his procedure well.    Ada Hendricks DO

## 2021-04-07 LAB
LAB AP CASE REPORT: NORMAL
PATH REPORT.FINAL DX SPEC: NORMAL
PATH REPORT.GROSS SPEC: NORMAL

## 2021-07-16 ENCOUNTER — TRANSCRIBE ORDERS (OUTPATIENT)
Dept: ADMINISTRATIVE | Facility: HOSPITAL | Age: 81
End: 2021-07-16

## 2021-07-16 DIAGNOSIS — Z03.89 CORONARY ARTERY DISEASE (CAD) EXCLUDED: ICD-10-CM

## 2021-07-16 DIAGNOSIS — R94.31 ABNORMAL EKG: Primary | ICD-10-CM

## 2021-07-29 ENCOUNTER — HOSPITAL ENCOUNTER (OUTPATIENT)
Dept: CARDIOLOGY | Facility: HOSPITAL | Age: 81
Discharge: HOME | End: 2021-07-29
Admitting: FAMILY MEDICINE

## 2021-07-29 DIAGNOSIS — Z03.89 CORONARY ARTERY DISEASE (CAD) EXCLUDED: ICD-10-CM

## 2021-07-29 DIAGNOSIS — R94.31 ABNORMAL EKG: ICD-10-CM

## 2021-07-29 LAB
BH CV STRESS BP STAGE 1: NORMAL
BH CV STRESS BP STAGE 2: NORMAL
BH CV STRESS DURATION MIN STAGE 1: 3
BH CV STRESS DURATION MIN STAGE 2: 3
BH CV STRESS DURATION SEC STAGE 1: 0
BH CV STRESS DURATION SEC STAGE 2: 0
BH CV STRESS GRADE STAGE 1: 10
BH CV STRESS GRADE STAGE 2: 12
BH CV STRESS HR STAGE 1: 119
BH CV STRESS HR STAGE 2: 136
BH CV STRESS METS STAGE 1: 5
BH CV STRESS METS STAGE 2: 7.5
BH CV STRESS PROTOCOL 1: NORMAL
BH CV STRESS RECOVERY BP: NORMAL MMHG
BH CV STRESS RECOVERY HR: 85 BPM
BH CV STRESS SPEED STAGE 1: 1.7
BH CV STRESS SPEED STAGE 2: 2.5
BH CV STRESS STAGE 1: 1
BH CV STRESS STAGE 2: 2
MAXIMAL PREDICTED HEART RATE: 141 BPM
PERCENT MAX PREDICTED HR: 96.45 %
STRESS BASELINE BP: NORMAL MMHG
STRESS BASELINE HR: 82 BPM
STRESS O2 SAT REST: 92 %
STRESS PERCENT HR: 113 %
STRESS POST ESTIMATED WORKLOAD: 7.1 METS
STRESS POST EXERCISE DUR MIN: 6 MIN
STRESS POST EXERCISE DUR SEC: 0 SEC
STRESS POST O2 SAT PEAK: 92 %
STRESS POST PEAK BP: NORMAL MMHG
STRESS POST PEAK HR: 136 BPM
STRESS TARGET HR: 120 BPM

## 2021-07-29 PROCEDURE — 93018 CV STRESS TEST I&R ONLY: CPT | Performed by: INTERNAL MEDICINE

## 2021-07-29 PROCEDURE — 93016 CV STRESS TEST SUPVJ ONLY: CPT | Performed by: INTERNAL MEDICINE

## 2021-07-29 PROCEDURE — 93017 CV STRESS TEST TRACING ONLY: CPT

## 2021-08-10 ENCOUNTER — OFFICE VISIT (OUTPATIENT)
Dept: CARDIOLOGY | Facility: CLINIC | Age: 81
End: 2021-08-10

## 2021-08-10 VITALS
SYSTOLIC BLOOD PRESSURE: 143 MMHG | HEART RATE: 70 BPM | WEIGHT: 166 LBS | DIASTOLIC BLOOD PRESSURE: 83 MMHG | HEIGHT: 71 IN | BODY MASS INDEX: 23.24 KG/M2

## 2021-08-10 DIAGNOSIS — R06.02 SOB (SHORTNESS OF BREATH): ICD-10-CM

## 2021-08-10 DIAGNOSIS — I25.10 CORONARY ARTERY DISEASE INVOLVING NATIVE CORONARY ARTERY OF NATIVE HEART WITHOUT ANGINA PECTORIS: Primary | ICD-10-CM

## 2021-08-10 PROCEDURE — 93000 ELECTROCARDIOGRAM COMPLETE: CPT | Performed by: INTERNAL MEDICINE

## 2021-08-10 PROCEDURE — 99203 OFFICE O/P NEW LOW 30 MIN: CPT | Performed by: INTERNAL MEDICINE

## 2021-08-10 RX ORDER — CHLORAL HYDRATE 500 MG
1000 CAPSULE ORAL DAILY
COMMUNITY
End: 2022-03-21

## 2021-08-10 RX ORDER — SULFASALAZINE 500 MG/1
1500 TABLET ORAL 2 TIMES DAILY
COMMUNITY
Start: 2021-08-04

## 2021-08-10 RX ORDER — UBIDECARENONE 75 MG
50 CAPSULE ORAL DAILY
COMMUNITY

## 2021-08-10 RX ORDER — DIMENHYDRINATE 50 MG
1000 TABLET ORAL DAILY
COMMUNITY
End: 2022-03-21

## 2021-08-10 NOTE — PROGRESS NOTES
NEW PT  DR WILSON  CAD   Subjective:        [facility]  Cardiology Consult Note    Patient Identification:  Name: Amol Brown  Age: 81 y.o.  Sex: male  :  1940  MRN: [id number]             CC  Sob uphill  Cad / stent     History of Present Illness:   81-year-old male known history of coronary artery disease previous angioplasty as well as stent here for the cardiac evaluation as well as establishment of the care as the patient complaining of moderate intensity shortness of breath walking uphill    Comorbid cardiac risk factors:     Past Medical History:  Past Medical History:   Diagnosis Date   • Arthritis    • CAD (coronary artery disease)    • Colon polyps    • HLD (hyperlipidemia)    • Thoracic aortic aneurysm (CMS/HCC)    • Thyroid disease      Past Surgical History:  Past Surgical History:   Procedure Laterality Date   • COLONOSCOPY     • COLONOSCOPY W/ POLYPECTOMY N/A 2021    Procedure: Colonoscopy with polypectomy;  Surgeon: Ada Hendricks DO;  Location: [facility];  Service: Gastroenterology;  Laterality: N/A;  sigmoid colon polyps x2  rectal polyps x3   • CORONARY ANGIOPLASTY WITH STENT PLACEMENT     • ENDOSCOPY     • ENDOSCOPY N/A 9/15/2020    Procedure: Esophagogastroduodenoscopy with biopsies, polypectomy;  Surgeon: Ada Hendricks DO;  Location: [facility] OR;  Service: Gastroenterology;  Laterality: N/A;  AUNDREA test  reflux esophagitis  mild gastritis  gastric polyp  duodenitis  GE junction biopsy  duodenum biopsy   • NECK SURGERY        Allergies:  No Known Allergies  Home Meds:  (Not in a hospital admission)    Current Meds:   [unfilled]  Social History:   Social History     Tobacco Use   • Smoking status: Former Smoker     Years: 2.00   • Smokeless tobacco: Never Used   • Tobacco comment: as a child   Substance Use Topics   • Alcohol use: Not Currently     Comment: quit oct 2019      Family History:  Family History   Problem Relation Age of Onset   • Aortic  aneurysm Mother    • Pancreatic cancer Father         Review of Systems    Constitutional: No weakness,fatigue, fever, rigors, chills   Eyes: No vision changes, eye pain   ENT/oropharynx: No difficulty swallowing, sore throat, epistaxis, changes in hearing   Cardiovascular:  Shortness of breath on exertion   Respiratory: No shortness of breath, dyspnea on exertion, cough, wheezing hemoptysis   Gastrointestinal: No abdominal pain, nausea, vomiting, diarrhea, bloody stools   Genitourinary: No hematuria, dysuria   Neurological: No headache, tremors, numbness,  one-sided weakness    Musculoskeletal: No cramps, myalgias,  joint pain, joint swelling   Integument: No rash, edema           Constitutional:  Heart Rate:  [70] 70  BP: (143)/(83) 143/83    Physical Exam   General:  Appears in no acute distress  Eyes: PERTL,  HEENT:  No JVD. Thyroid not visibly enlarged. No mucosal pallor or cyanosis  Respiratory: Respirations regular and unlabored at rest. BBS with good air entry in all fields. No crackles, rubs or wheezes auscultated  Cardiovascular: S1S2 Regular rate and rhythm. sys murmur, rub or gallop auscultated. No carotid bruits. DP/PT pulses    . No pretibial pitting edema  Gastrointestinal: Abdomen soft, flat, non tender. Bowel sounds present. No hepatosplenomegaly. No ascites  Musculoskeletal: GONZALEZ x4. No abnormal movements  Extremities: No digital clubbing or cyanosis  Skin: Color pink. Skin warm and dry to touch. No rashes  No xanthoma  Neuro: AAO x3 CN II-XII grossly intact            ECG 12 Lead    Date/Time: 8/10/2021 1:43 PM  Performed by: Juni Melendrez MD  Authorized by: Juni Melendrez MD   Comparison: not compared with previous ECG   Previous ECG: no previous ECG available  Rhythm: sinus rhythm  ST Flattening: all    Clinical impression: non-specific ECG                Cardiographics  ECG:     Telemetry:    Echocardiogram:     Imaging  Chest X-ray:     Lab Review               [email or web address]@               Assessment:/ Recommendations / Plan:   Patient Active Problem List   Diagnosis   • Colon cancer screening                    ICD-10-CM ICD-9-CM   1. Coronary artery disease involving native coronary artery of native heart without angina pectoris  I25.10 414.01   2. SOB (shortness of breath)  R06.02 786.05     1. Coronary artery disease involving native coronary artery of native heart without angina pectoris  Considering the patient's symptoms as well as clinical situation and  EKG findings, along with cardiac risk factors, ischemic workup is necessary to rule out ischemic cardiomyopathy, stress induced arrhythmias, and functional capacity for diagnosis as well as prognostic consideration    - Stress Test With Myocardial Perfusion (1 Day)  - Adult Transthoracic Echo Complete W/ Cont if Necessary Per Protocol    2. SOB (shortness of breath)  Considering patient's medical condition as well as the risk factors, patient will require echocardiogram for further evaluation for the LV function, four-chamber evaluation, including the pressures, valvular function and  pericardial disease and pericardial effusion    - Stress Test With Myocardial Perfusion (1 Day)  - Adult Transthoracic Echo Complete W/ Cont if Necessary Per Protocol     Stress cardiolyte, echo    Labs/tests ordered for am      Juni Melendrez MD  8/10/2021, 13:43 EDT      EMR Dragon/Transcription:   Dictated utilizing Dragon dictation

## 2021-08-30 ENCOUNTER — HOSPITAL ENCOUNTER (OUTPATIENT)
Dept: CARDIOLOGY | Facility: HOSPITAL | Age: 81
Discharge: HOME | End: 2021-08-30

## 2021-08-30 ENCOUNTER — HOSPITAL ENCOUNTER (OUTPATIENT)
Dept: CARDIOLOGY | Facility: HOSPITAL | Age: 81
Discharge: HOME | End: 2021-08-30
Admitting: INTERNAL MEDICINE

## 2021-08-30 VITALS
OXYGEN SATURATION: 96 % | RESPIRATION RATE: 16 BRPM | WEIGHT: 166 LBS | HEART RATE: 63 BPM | BODY MASS INDEX: 23.24 KG/M2 | SYSTOLIC BLOOD PRESSURE: 140 MMHG | DIASTOLIC BLOOD PRESSURE: 67 MMHG | HEIGHT: 71 IN

## 2021-08-30 VITALS
HEART RATE: 65 BPM | OXYGEN SATURATION: 93 % | BODY MASS INDEX: 23.15 KG/M2 | DIASTOLIC BLOOD PRESSURE: 62 MMHG | WEIGHT: 165.34 LBS | HEIGHT: 71 IN | SYSTOLIC BLOOD PRESSURE: 110 MMHG

## 2021-08-30 LAB
BH CV IMMEDIATE POST TECH DATA BLOOD PRESSURE: NORMAL MMHG
BH CV IMMEDIATE POST TECH DATA HEART RATE: 112 BPM
BH CV REST NUCLEAR ISOTOPE DOSE: 10.9 MCI
BH CV STRESS BP STAGE 1: NORMAL
BH CV STRESS BP STAGE 2: NORMAL
BH CV STRESS DURATION MIN STAGE 1: 3
BH CV STRESS DURATION MIN STAGE 2: 2
BH CV STRESS DURATION SEC STAGE 1: 0
BH CV STRESS DURATION SEC STAGE 2: 20
BH CV STRESS GRADE STAGE 1: 10
BH CV STRESS GRADE STAGE 2: 12
BH CV STRESS HR STAGE 1: 103
BH CV STRESS HR STAGE 2: 126
BH CV STRESS METS STAGE 1: 3.5
BH CV STRESS METS STAGE 2: 5.9
BH CV STRESS NUCLEAR ISOTOPE DOSE: 32.1 MCI
BH CV STRESS PROTOCOL 1: NORMAL
BH CV STRESS RECOVERY BP: NORMAL MMHG
BH CV STRESS RECOVERY HR: 73 BPM
BH CV STRESS RECOVERY O2: 97 %
BH CV STRESS SPEED STAGE 1: 1.7
BH CV STRESS SPEED STAGE 2: 2.5
BH CV STRESS STAGE 1: 1
BH CV STRESS STAGE 2: 2
BH CV THREE MINUTE POST TECH DATA BLOOD PRESSURE: NORMAL MMHG
BH CV THREE MINUTE POST TECH DATA HEART RATE: 80 BPM
LV EF NUC BP: 68 %
MAXIMAL PREDICTED HEART RATE: 141 BPM
PERCENT MAX PREDICTED HR: 89.36 %
STRESS BASELINE BP: NORMAL MMHG
STRESS BASELINE HR: 78 BPM
STRESS O2 SAT REST: 96 %
STRESS PERCENT HR: 105 %
STRESS POST ESTIMATED WORKLOAD: 5.9 METS
STRESS POST EXERCISE DUR MIN: 5 MIN
STRESS POST EXERCISE DUR SEC: 20 SEC
STRESS POST PEAK BP: NORMAL MMHG
STRESS POST PEAK HR: 126 BPM
STRESS TARGET HR: 120 BPM

## 2021-08-30 PROCEDURE — 78452 HT MUSCLE IMAGE SPECT MULT: CPT

## 2021-08-30 PROCEDURE — 93018 CV STRESS TEST I&R ONLY: CPT | Performed by: INTERNAL MEDICINE

## 2021-08-30 PROCEDURE — 93017 CV STRESS TEST TRACING ONLY: CPT

## 2021-08-30 PROCEDURE — 93306 TTE W/DOPPLER COMPLETE: CPT | Performed by: INTERNAL MEDICINE

## 2021-08-30 PROCEDURE — 0 TECHNETIUM SESTAMIBI: Performed by: INTERNAL MEDICINE

## 2021-08-30 PROCEDURE — A9500 TC99M SESTAMIBI: HCPCS | Performed by: INTERNAL MEDICINE

## 2021-08-30 PROCEDURE — 78452 HT MUSCLE IMAGE SPECT MULT: CPT | Performed by: INTERNAL MEDICINE

## 2021-08-30 PROCEDURE — 93306 TTE W/DOPPLER COMPLETE: CPT

## 2021-08-30 RX ORDER — LEVOTHYROXINE SODIUM 75 UG/1
75 TABLET ORAL DAILY
COMMUNITY
Start: 2021-08-10

## 2021-08-30 RX ADMIN — TECHNETIUM TC 99M SESTAMIBI 1 DOSE: 1 INJECTION INTRAVENOUS at 07:30

## 2021-08-30 RX ADMIN — TECHNETIUM TC 99M SESTAMIBI 1 DOSE: 1 INJECTION INTRAVENOUS at 10:01

## 2021-08-31 ENCOUNTER — TELEPHONE (OUTPATIENT)
Dept: CARDIOLOGY | Facility: CLINIC | Age: 81
End: 2021-08-31

## 2021-08-31 LAB
AORTIC ARCH: 2.3 CM
AORTIC DIMENSIONLESS INDEX: 0.7 (DI)
ASCENDING AORTA: 3.8 CM
BH CV ECHO MEAS - ACS: 1.5 CM
BH CV ECHO MEAS - AO ACC TIME: 0.08 SEC
BH CV ECHO MEAS - AO MAX PG (FULL): 5.9 MMHG
BH CV ECHO MEAS - AO MAX PG: 11.1 MMHG
BH CV ECHO MEAS - AO MEAN PG (FULL): 3.4 MMHG
BH CV ECHO MEAS - AO MEAN PG: 6 MMHG
BH CV ECHO MEAS - AO ROOT AREA (BSA CORRECTED): 1.4
BH CV ECHO MEAS - AO ROOT AREA: 6.1 CM^2
BH CV ECHO MEAS - AO ROOT DIAM: 2.8 CM
BH CV ECHO MEAS - AO V2 MAX: 166.8 CM/SEC
BH CV ECHO MEAS - AO V2 MEAN: 115.6 CM/SEC
BH CV ECHO MEAS - AO V2 VTI: 34.5 CM
BH CV ECHO MEAS - ASC AORTA: 3.8 CM
BH CV ECHO MEAS - AVA(I,A): 2.8 CM^2
BH CV ECHO MEAS - AVA(I,D): 2.8 CM^2
BH CV ECHO MEAS - AVA(V,A): 2.6 CM^2
BH CV ECHO MEAS - AVA(V,D): 2.6 CM^2
BH CV ECHO MEAS - BSA(HAYCOCK): 1.9 M^2
BH CV ECHO MEAS - BSA: 1.9 M^2
BH CV ECHO MEAS - BZI_BMI: 23.1 KILOGRAMS/M^2
BH CV ECHO MEAS - BZI_METRIC_HEIGHT: 180 CM
BH CV ECHO MEAS - BZI_METRIC_WEIGHT: 75 KG
BH CV ECHO MEAS - EDV(CUBED): 76.5 ML
BH CV ECHO MEAS - EDV(MOD-SP2): 126 ML
BH CV ECHO MEAS - EDV(MOD-SP4): 121 ML
BH CV ECHO MEAS - EDV(TEICH): 80.6 ML
BH CV ECHO MEAS - EF(CUBED): 74.1 %
BH CV ECHO MEAS - EF(MOD-BP): 63.9 %
BH CV ECHO MEAS - EF(MOD-SP2): 60.3 %
BH CV ECHO MEAS - EF(MOD-SP4): 64.5 %
BH CV ECHO MEAS - EF(TEICH): 66.3 %
BH CV ECHO MEAS - ESV(CUBED): 19.8 ML
BH CV ECHO MEAS - ESV(MOD-SP2): 50 ML
BH CV ECHO MEAS - ESV(MOD-SP4): 43 ML
BH CV ECHO MEAS - ESV(TEICH): 27.1 ML
BH CV ECHO MEAS - FS: 36.3 %
BH CV ECHO MEAS - IVS/LVPW: 1.1
BH CV ECHO MEAS - IVSD: 0.94 CM
BH CV ECHO MEAS - LAT PEAK E' VEL: 12.8 CM/SEC
BH CV ECHO MEAS - LV DIASTOLIC VOL/BSA (35-75): 62.3 ML/M^2
BH CV ECHO MEAS - LV MASS(C)D: 118.1 GRAMS
BH CV ECHO MEAS - LV MASS(C)DI: 60.8 GRAMS/M^2
BH CV ECHO MEAS - LV MAX PG: 5.3 MMHG
BH CV ECHO MEAS - LV MEAN PG: 2.7 MMHG
BH CV ECHO MEAS - LV SYSTOLIC VOL/BSA (12-30): 22.1 ML/M^2
BH CV ECHO MEAS - LV V1 MAX: 114.8 CM/SEC
BH CV ECHO MEAS - LV V1 MEAN: 76.4 CM/SEC
BH CV ECHO MEAS - LV V1 VTI: 25.2 CM
BH CV ECHO MEAS - LVIDD: 4.2 CM
BH CV ECHO MEAS - LVIDS: 2.7 CM
BH CV ECHO MEAS - LVLD AP2: 10.3 CM
BH CV ECHO MEAS - LVLD AP4: 9.1 CM
BH CV ECHO MEAS - LVLS AP2: 6.9 CM
BH CV ECHO MEAS - LVLS AP4: 6.4 CM
BH CV ECHO MEAS - LVOT AREA (M): 3.8 CM^2
BH CV ECHO MEAS - LVOT AREA: 3.8 CM^2
BH CV ECHO MEAS - LVOT DIAM: 2.2 CM
BH CV ECHO MEAS - LVPWD: 0.83 CM
BH CV ECHO MEAS - MED PEAK E' VEL: 7.6 CM/SEC
BH CV ECHO MEAS - MV A DUR: 0.12 SEC
BH CV ECHO MEAS - MV A MAX VEL: 85.3 CM/SEC
BH CV ECHO MEAS - MV DEC SLOPE: 200.5 CM/SEC^2
BH CV ECHO MEAS - MV DEC TIME: 310 SEC
BH CV ECHO MEAS - MV E MAX VEL: 61.7 CM/SEC
BH CV ECHO MEAS - MV E/A: 0.72
BH CV ECHO MEAS - MV MAX PG: 2.8 MMHG
BH CV ECHO MEAS - MV MEAN PG: 0.88 MMHG
BH CV ECHO MEAS - MV P1/2T MAX VEL: 60.3 CM/SEC
BH CV ECHO MEAS - MV P1/2T: 88.1 MSEC
BH CV ECHO MEAS - MV V2 MAX: 84.2 CM/SEC
BH CV ECHO MEAS - MV V2 MEAN: 42.2 CM/SEC
BH CV ECHO MEAS - MV V2 VTI: 24.1 CM
BH CV ECHO MEAS - MVA P1/2T LCG: 3.7 CM^2
BH CV ECHO MEAS - MVA(P1/2T): 2.5 CM^2
BH CV ECHO MEAS - MVA(VTI): 4 CM^2
BH CV ECHO MEAS - PA ACC TIME: 0.09 SEC
BH CV ECHO MEAS - PA MAX PG (FULL): 2.7 MMHG
BH CV ECHO MEAS - PA MAX PG: 4.2 MMHG
BH CV ECHO MEAS - PA PR(ACCEL): 39.4 MMHG
BH CV ECHO MEAS - PA V2 MAX: 102.5 CM/SEC
BH CV ECHO MEAS - PI END-D VEL: 83.4 CM/SEC
BH CV ECHO MEAS - PULM A REVS DUR: 0.08 SEC
BH CV ECHO MEAS - PULM A REVS VEL: 38.8 CM/SEC
BH CV ECHO MEAS - PULM DIAS VEL: 38.3 CM/SEC
BH CV ECHO MEAS - PULM S/D: 1.6
BH CV ECHO MEAS - PULM SYS VEL: 60.1 CM/SEC
BH CV ECHO MEAS - PVA(V,A): 3.2 CM^2
BH CV ECHO MEAS - PVA(V,D): 3.2 CM^2
BH CV ECHO MEAS - QP/QS: 0.67
BH CV ECHO MEAS - RAP SYSTOLE: 3 MMHG
BH CV ECHO MEAS - RV MAX PG: 1.5 MMHG
BH CV ECHO MEAS - RV MEAN PG: 0.79 MMHG
BH CV ECHO MEAS - RV V1 MAX: 61.6 CM/SEC
BH CV ECHO MEAS - RV V1 MEAN: 40.7 CM/SEC
BH CV ECHO MEAS - RV V1 VTI: 12 CM
BH CV ECHO MEAS - RVOT AREA: 5.4 CM^2
BH CV ECHO MEAS - RVOT DIAM: 2.6 CM
BH CV ECHO MEAS - RVSP: 24 MMHG
BH CV ECHO MEAS - SI(AO): 107.8 ML/M^2
BH CV ECHO MEAS - SI(CUBED): 29.2 ML/M^2
BH CV ECHO MEAS - SI(LVOT): 49.7 ML/M^2
BH CV ECHO MEAS - SI(MOD-SP2): 39.1 ML/M^2
BH CV ECHO MEAS - SI(MOD-SP4): 40.2 ML/M^2
BH CV ECHO MEAS - SI(TEICH): 27.5 ML/M^2
BH CV ECHO MEAS - SUP REN AO DIAM: 1.8 CM
BH CV ECHO MEAS - SV(AO): 209.3 ML
BH CV ECHO MEAS - SV(CUBED): 56.7 ML
BH CV ECHO MEAS - SV(LVOT): 96.6 ML
BH CV ECHO MEAS - SV(MOD-SP2): 76 ML
BH CV ECHO MEAS - SV(MOD-SP4): 78 ML
BH CV ECHO MEAS - SV(RVOT): 64.9 ML
BH CV ECHO MEAS - SV(TEICH): 53.5 ML
BH CV ECHO MEAS - TAPSE (>1.6): 2.2 CM
BH CV ECHO MEAS - TR MAX VEL: 229.1 CM/SEC
BH CV ECHO MEASUREMENTS AVERAGE E/E' RATIO: 6.05
BH CV VAS BP LEFT ARM: NORMAL MMHG
BH CV XLRA - RV BASE: 3.2 CM
BH CV XLRA - RV LENGTH: 7.3 CM
BH CV XLRA - RV MID: 2.5 CM
BH CV XLRA - TDI S': 12.4 CM/SEC
LEFT ATRIUM VOLUME INDEX: 28 ML/M2
MAXIMAL PREDICTED HEART RATE: 141 BPM
SINUS: 3.8 CM
STJ: 3.4 CM
STRESS TARGET HR: 120 BPM

## 2021-08-31 NOTE — TELEPHONE ENCOUNTER
Notified of normal stress test. Stress testing carries 85% specificity/sensitivity/cardiac workup has been explained, and does not rule out coronary artery disease or future events, continue to emphasize on risk reductions for coronary artery disease.  Explained if symptoms continue please go to ER, and further w/p will be required.

## 2021-10-26 ENCOUNTER — OFFICE VISIT (OUTPATIENT)
Dept: CARDIOLOGY | Facility: CLINIC | Age: 81
End: 2021-10-26

## 2021-10-26 VITALS
BODY MASS INDEX: 23.24 KG/M2 | DIASTOLIC BLOOD PRESSURE: 72 MMHG | HEIGHT: 71 IN | SYSTOLIC BLOOD PRESSURE: 110 MMHG | WEIGHT: 166 LBS | HEART RATE: 72 BPM

## 2021-10-26 DIAGNOSIS — I25.10 CORONARY ARTERY DISEASE INVOLVING NATIVE CORONARY ARTERY OF NATIVE HEART WITHOUT ANGINA PECTORIS: Primary | ICD-10-CM

## 2021-10-26 DIAGNOSIS — R06.02 SOB (SHORTNESS OF BREATH): ICD-10-CM

## 2021-10-26 DIAGNOSIS — E78.5 HYPERLIPIDEMIA, UNSPECIFIED HYPERLIPIDEMIA TYPE: ICD-10-CM

## 2021-10-26 PROCEDURE — 99213 OFFICE O/P EST LOW 20 MIN: CPT | Performed by: INTERNAL MEDICINE

## 2021-10-26 RX ORDER — RIFAMPIN 300 MG/1
CAPSULE ORAL
COMMUNITY
Start: 2021-09-27

## 2021-10-26 RX ORDER — ETHAMBUTOL HYDROCHLORIDE 400 MG/1
TABLET, FILM COATED ORAL
COMMUNITY
Start: 2021-09-27

## 2021-10-26 RX ORDER — AMIKACIN 590 MG/8.4ML
SUSPENSION RESPIRATORY (INHALATION)
COMMUNITY
Start: 2021-10-13

## 2021-10-26 RX ORDER — AZITHROMYCIN 500 MG/1
TABLET, FILM COATED ORAL
COMMUNITY
Start: 2021-09-27

## 2021-10-26 NOTE — PROGRESS NOTES
"RESULTS    Subjective:        Amol Brown is a 81 y.o. male who here for follow up    CC  Follow-up coronary artery disease  HPI  81-year-old male with a history of coronary artery disease hyperlipidemia shortness of breath here for the follow-up with no complaints of chest pains tightness heaviness or the pressure sensation     Problems Addressed this Visit        Cardiac and Vasculature    Coronary artery disease involving native coronary artery of native heart without angina pectoris - Primary    Hyperlipidemia       Pulmonary and Pneumonias    SOB (shortness of breath)      Diagnoses       Codes Comments    Coronary artery disease involving native coronary artery of native heart without angina pectoris    -  Primary ICD-10-CM: I25.10  ICD-9-CM: 414.01     SOB (shortness of breath)     ICD-10-CM: R06.02  ICD-9-CM: 786.05     Hyperlipidemia, unspecified hyperlipidemia type     ICD-10-CM: E78.5  ICD-9-CM: 272.4         .    The following portions of the patient's history were reviewed and updated as appropriate: allergies, current medications, past family history, past medical history, past social history, past surgical history and problem list.    Past Medical History:   Diagnosis Date   • AAA (abdominal aortic aneurysm) (HCC)     ascending aortia per patient \"4 cm\"  8/30/21   • Arthritis    • CAD (coronary artery disease)    • Colon polyps    • HLD (hyperlipidemia)    • Thoracic aortic aneurysm (HCC)    • Thyroid disease      reports that he has quit smoking. He quit after 2.00 years of use. He has never used smokeless tobacco. He reports previous alcohol use. He reports that he does not use drugs.   Family History   Problem Relation Age of Onset   • Aortic aneurysm Mother    • Pancreatic cancer Father        Review of Systems  Constitutional: No wt loss, fever, fatigue  Gastrointestinal: No nausea, abdominal pain  Behavioral/Psych: No insomnia or anxiety   Cardiovascular no chest pains or tightness in the " "chest  Objective:       Physical Exam  /72   Pulse 72   Ht 180.3 cm (71\")   Wt 75.3 kg (166 lb)   BMI 23.15 kg/m²   General appearance: No acute changes   Neck: Trachea midline; NECK, supple, no thyromegaly or lymphadenopathy   Lungs: Normal size and shape, normal breath sounds, equal distribution of air, no rales and rhonchi   CV: S1-S2 regular, no murmurs, no rub, no gallop   Abdomen: Soft, non-tender; no masses , no abnormal abdominal sounds   Extremities: No deformity , normal color , no peripheral edema   Skin: Normal temperature, turgor and texture; no rash, ulcers          Procedures    Interpretation Summary       · Moderate risk for ischemic heart disease.  · Findings consistent with an abnormal ECG stress test.  · Left ventricular ejection fraction is normal. (Calculated EF = 68%).  · Myocardial perfusion imaging indicates a normal myocardial perfusion study with no evidence of ischemia.  · Impressions are consistent with a low risk study.    Interpretation Summary    · Calculated left ventricular EF = 63.9% Estimated left ventricular EF was in agreement with the calculated left ventricular EF.  · Left ventricular diastolic function is consistent with (grade I) impaired relaxation.  · There is no evidence of pericardial effusion.      Echocardiogram:        Current Outpatient Medications:   •  Arikayce 590 MG/8.4ML suspension, , Disp: , Rfl:   •  aspirin 81 MG EC tablet, Take 81 mg by mouth Daily., Disp: , Rfl:   •  atorvastatin (LIPITOR) 20 MG tablet, Take 20 mg by mouth Daily., Disp: , Rfl:   •  azithromycin (ZITHROMAX) 500 MG tablet, , Disp: , Rfl:   •  ethambutol (MYAMBUTOL) 400 MG tablet, , Disp: , Rfl:   •  Euthyrox 75 MCG tablet, Take 75 mcg by mouth Daily., Disp: , Rfl:   •  Flaxseed, Linseed, (Flax Seed Oil) 1000 MG capsule, Take 1,000 mg by mouth Daily., Disp: , Rfl:   •  Omega-3 Fatty Acids (fish oil) 1000 MG capsule capsule, Take 1,000 mg by mouth Daily., Disp: , Rfl:   •  omeprazole " (priLOSEC) 20 MG capsule, Take 40 mg by mouth Daily., Disp: , Rfl:   •  ramipril (ALTACE) 5 MG capsule, Take 5 mg by mouth Daily., Disp: , Rfl:   •  rifAMPin (RIFADIN) 300 MG capsule, , Disp: , Rfl:   •  sulfaSALAzine (AZULFIDINE) 500 MG tablet, Take 1,500 mg by mouth 2 (Two) Times a Day., Disp: , Rfl:   •  vitamin B-12 (cyanocobalamin) 100 MCG tablet, Take 50 mcg by mouth Daily., Disp: , Rfl:    Assessment:        Patient Active Problem List   Diagnosis   • Colon cancer screening   • Coronary artery disease involving native coronary artery of native heart without angina pectoris   • SOB (shortness of breath)               Plan:            ICD-10-CM ICD-9-CM   1. Coronary artery disease involving native coronary artery of native heart without angina pectoris  I25.10 414.01   2. SOB (shortness of breath)  R06.02 786.05   3. Hyperlipidemia, unspecified hyperlipidemia type  E78.5 272.4     1. Coronary artery disease involving native coronary artery of native heart without angina pectoris  No angina pectoris    2. SOB (shortness of breath)  Multifactorial    3. Hyperlipidemia, unspecified hyperlipidemia type  Continue current treatment       Specificity and sensitivity of the stress test/ cardiac workup has been explained. Pt has been explained if  Symptoms continue please go to ER, and further w/p will be required.    Also explained this does not rule out coronary artery disease or the future events, continue to emphasize on risk reductions for coronary artery disease    Pt also advised to contact PCP for other causes of symptoms    1 YR    COUNSELING:    Amol Perez was given to patient for the following topics: diagnostic results, risk factor reductions, impressions, risks and benefits of treatment options and importance of treatment compliance .       SMOKING COUNSELING:    [unfilled]    Dictated using Dragon dictation

## 2022-03-18 ENCOUNTER — LAB (OUTPATIENT)
Dept: LAB | Facility: HOSPITAL | Age: 82
End: 2022-03-18

## 2022-03-18 ENCOUNTER — PATIENT ROUNDING (BHMG ONLY) (OUTPATIENT)
Dept: INFECTIOUS DISEASES | Facility: CLINIC | Age: 82
End: 2022-03-18

## 2022-03-18 ENCOUNTER — OFFICE VISIT (OUTPATIENT)
Dept: INFECTIOUS DISEASES | Facility: CLINIC | Age: 82
End: 2022-03-18

## 2022-03-18 VITALS
DIASTOLIC BLOOD PRESSURE: 81 MMHG | HEIGHT: 71 IN | WEIGHT: 162.8 LBS | TEMPERATURE: 97.7 F | BODY MASS INDEX: 22.79 KG/M2 | OXYGEN SATURATION: 90 % | HEART RATE: 80 BPM | RESPIRATION RATE: 18 BRPM | SYSTOLIC BLOOD PRESSURE: 127 MMHG

## 2022-03-18 DIAGNOSIS — A31.0 MYCOBACTERIUM AVIUM COMPLEX: ICD-10-CM

## 2022-03-18 DIAGNOSIS — R91.8 PULMONARY NODULES/LESIONS, MULTIPLE: ICD-10-CM

## 2022-03-18 DIAGNOSIS — A31.0 MYCOBACTERIUM AVIUM COMPLEX: Primary | ICD-10-CM

## 2022-03-18 LAB — CRYPTOC AG CSF QL: NEGATIVE

## 2022-03-18 PROCEDURE — 87449 NOS EACH ORGANISM AG IA: CPT

## 2022-03-18 PROCEDURE — 36415 COLL VENOUS BLD VENIPUNCTURE: CPT

## 2022-03-18 PROCEDURE — 86698 HISTOPLASMA ANTIBODY: CPT

## 2022-03-18 PROCEDURE — 99205 OFFICE O/P NEW HI 60 MIN: CPT | Performed by: INTERNAL MEDICINE

## 2022-03-18 PROCEDURE — 87899 AGENT NOS ASSAY W/OPTIC: CPT

## 2022-03-18 PROCEDURE — 87305 ASPERGILLUS AG IA: CPT

## 2022-03-18 PROCEDURE — 87385 HISTOPLASMA CAPSUL AG IA: CPT

## 2022-03-18 RX ORDER — FAMOTIDINE 40 MG/1
TABLET, FILM COATED ORAL
COMMUNITY
Start: 2022-02-18

## 2022-03-18 NOTE — PROGRESS NOTES
Referring Provider: Prakash Hatch  [place] [address]  Reason for clinic visits: Initial infectious disease clinic visit for second opinion regarding pulmonary MAC infection    HPI: Amol Brown is a 81 y.o. male who presents to the infectious disease clinic with above complaint.  The patient originally presented to [facility] in August 2019 with complaints of cough for the past year.  Per the medical record he had some concerning findings on chest x-ray and CAT scan.  These are unavailable for my review.  He had a sputum culture grew out JOSE DAVID.  He was started on rifampin 600 mg p.o. daily, azithromycin 500 mg p.o. daily and ethambutol 1200 mg p.o. daily in October 2019.  He did not report any medication side effects.  His repeat follow-up visit in March 2020 he reported less cough stable weight and no shortness of breath.  Repeat AFB sputum's in December 2019 came back negative.  He was seen in follow-up in July 2020 and noted to be unable to produce sputum.  Less cough and shortness of breath and gaining weight.  Per the medical record a CAT scan done in March 2020 as compared to September 2019 showed improvement.  He was taken off of therapy in April 2021 after 18 months of therapy.  Of note AFB sputum's were not obtained in 2020 as the patient was not producing any sputum.  Was seen in the infectious disease clinic in May 2021 after being off of antibiotics for about a month.  Of note they were able to collect a sputum culture in April 2021 which came back positive for MAC.  Symptomatically the patient was doing well with not much cough.  He had repeat sputum done in July and September 2021 both of which came back positive for MAC.  No sensitivities were available for my review.  In his July 2021 visit he was noted once again to have increasing cough with sputum production.  He reported no appetite. Patient was last seen at the [facility] in  "September 2021.  At that time he was started on inhaled amikacin, ethambutol 1200 mg p.o. daily, azithromycin 500 mg p.o. daily and rifampin 600 mg p.o. daily.    The patient states despite being on antibiotics he continues to have progressive dyspnea on exertion.  He finds that he can do less months without getting short of breath.  He states he has been prescribed oxygen recently and uses it occasionally.  He was supposed to get a CPAP for his obstructive sleep apnea but has not been able to do so.  He states he continues to have a daily cough but this is not his predominant symptom.  He states he does produce white to clear sputum mostly after inhaled amikacin.  He states that he has lost about 20 pounds in the last year due to lack of appetite.  He denies any fevers chills or night sweats.  He denies any abdominal pain nausea vomiting or diarrhea.  No rashes.    Past Medical History:   Diagnosis Date   • AAA (abdominal aortic aneurysm) (HCC)     ascending aortia per patient \"4 cm\"  8/30/21   • Arthritis    • CAD (coronary artery disease)    • Colon polyps    • HLD (hyperlipidemia)    • Thoracic aortic aneurysm (HCC)    • Thyroid disease        Past Surgical History:   Procedure Laterality Date   • CARDIAC CATHETERIZATION     • COLONOSCOPY     • COLONOSCOPY W/ POLYPECTOMY N/A 4/6/2021    Procedure: Colonoscopy with polypectomy;  Surgeon: Ada Hendricks DO;  Location: [facility];  Service: Gastroenterology;  Laterality: N/A;  sigmoid colon polyps x2  rectal polyps x3   • CORONARY ANGIOPLASTY WITH STENT PLACEMENT     • ENDOSCOPY     • ENDOSCOPY N/A 9/15/2020    Procedure: Esophagogastroduodenoscopy with biopsies, polypectomy;  Surgeon: Ada Hendricks DO;  Location: [facility] OR;  Service: Gastroenterology;  Laterality: N/A;  AUNDREA test  reflux esophagitis  mild gastritis  gastric polyp  duodenitis  GE junction biopsy  duodenum biopsy   • NECK SURGERY         Social History   reports that he has quit smoking. " He quit after 2.00 years of use. He has never used smokeless tobacco. He reports previous alcohol use. He reports that he does not use drugs.    Family History  family history includes Aortic aneurysm in his mother; Pancreatic cancer in his father.    No Known Allergies    The medication list has been reviewed and updated.     Review of Systems  Pertinent items are noted in HPI, all other systems reviewed and negative    Vital Signs   Vitals:    03/18/22 1008   BP: 127/81   Pulse: 80   Resp: 18   Temp: 97.7 °F (36.5 °C)   SpO2: 90%     Physical Exam:   General: In no acute distress  HEENT: Normocephalic, atraumatic, no scleral icterus. Oropharynx is clear and moist  Neck: Supple, trachea is midline  Cardiovascular: Normal rate, regular rhythm, christine S1 and S2, no murmurs, rubs, or gallops    Respiratory: Occasional crackles no expiratory wheezing   GI: Abdomen is soft, non-tender, non-distended, positive bowel sounds bilaterally  Skin: No rashes   LE: no E/C/C  Neurological: Alert and oriented, moving all 4 extremities  Psychiatric: Normal mood and affect     Lab Results   Component Value Date    WBC 5.79 02/19/2021    HGB 11.5 (L) 02/19/2021    HCT 37.8 (L) 02/19/2021    .6 (H) 02/19/2021     02/19/2021       Lab Results   Component Value Date    BUN 20 05/14/2020    CREATININE 0.9 05/14/2020    BCR 22.2 05/14/2020    CO2 30 05/14/2020    CALCIUM 8.9 05/14/2020    ALBUMIN 4.2 05/14/2020    LABIL2 1.4 05/14/2020    AST 27 05/14/2020    ALT 13 05/14/2020 2/2/22 AFB sputum Mycobacterium fortuitum complex  12/3/21 AFB sputum neg  10/29/21 AFB sputum negative     9/3/21 AFB sputum + MAC  7/13/21 AFB sputum + MAC   4/19/21 AFB sputum + MAC  12/2019 AFB sputum neg  9/2019 AFB sputum + MAC (sensitive to amikacin clarithromycin linezolid moxifloxacin  4/2019 AFB sputum + MAC    1/28/22 CT of the chest as compared to one dated February 2021 with mild peripheral bronchiectasis.  Patchy nodular consolidation  and confluent groundglass opacification bilaterally new as compared to prior exam.  Several new nodular opacities in the left upper lobe largest measuring 7 mm.  6 mm nodule in the left lower lobe new as compared to February 2021.    7/13/2021 CT of the chest shows scattered groundglass and nodular opacities bilaterally.  Pleural thickening and scarring right greater than left.  Bilateral pulmonary nodules measuring 5 mm    Assessment:  This is a 81 y.o. male who presents to clinic today for second opinion regarding his Mycobacterium avium complex treatment.  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  He had one negative sputum in December 2019 but further sputum was unable to be collected as the patient stopped producing sputum.  While on therapy his respiratory symptoms improved.  Unfortunately, even in April 2021 when his therapy was stopped his sputum once again grew MAC.  He started experiencing recurrent symptoms at this time instead of cough was increasing dyspnea on exertion.  He was restarted on rifampin ethambutol and azithromycin in addition to inhaled amikacin (I am assuming repeat sensitivities were obtained to make sure he remains macrolide sensitive but I do not see those available for my review) in October 2021 and sputum cultures obtained at the end of October and December 2021 are negative.  Given the repeat negative sputum AFB cultures this is reassuring that the treatment is working although the patient denies any clinical improvement.  A follow-up CAT scan has not been obtained.  His February sputum cultures are growing Mycobacterium fortuitum making this perhaps a secondary infectious agent or a colonizer.  Repeat sputum's will need to be obtained to determine the significance of Mycobacterium fortuitum.  If he continues to grow Mycobacterium fortuitum on subsequent sputum cultures alternative therapy will need to be considered.    Given his  persistent respiratory symptoms and nodular appearance of infiltrates I will initiate a fungal work-up.    Bottom line is I discussed all the above with the patient and encouraged him to continue following up with [facility].  I also encouraged him to provide additional AFB sputum's as they have requested.  The patient states getting to [facility] [place] has proved challenging.  We are certainly more than happy to obtain/process AFB sputum cultures at a [facility] facility that would be closer to his home.  I told the patient to let me know if he would like me to place orders for [facility].  Those results would be available to [facility].    Return to Infectious Disease clinic PRN    Time: More than 50% of time spent in counseling and coordination of care:  Total face-to-face/floor time 60 min.  Time spent in counseling 60 min. Counseling included the following topics: See assessment above

## 2022-03-18 NOTE — PROGRESS NOTES
March 18, 2022      [facility]  [address] [address] [phone].           We're always looking for ways to make our patients' experiences even better. Do you have recommendations on ways we may improve?  no    Overall were you satisfied with your first visit to our practice? yes       I appreciate you taking the time to speak with me today. Is there anything else I can do for you? no      Thank you, and have a great day.

## 2022-03-24 LAB
H CAPSUL AG SPEC QL: NORMAL
REF LAB TEST METHOD: NORMAL

## 2022-03-25 LAB — REF LAB TEST RESULTS: NORMAL

## 2022-03-29 LAB — REF LAB TEST METHOD: NORMAL

## (undated) DEVICE — SUCTION CANISTER, 3000CC,SAFELINER: Brand: DEROYAL

## (undated) DEVICE — SPNG GZ WOVN 4X4IN 12PLY 10/BX STRL

## (undated) DEVICE — VIAL FORMALIN CAP 10P 40ML

## (undated) DEVICE — KT ORCA ORCAPOD DISP STRL

## (undated) DEVICE — Device

## (undated) DEVICE — BW-412T DISP COMBO CLEANING BRUSH: Brand: SINGLE USE COMBINATION CLEANING BRUSH

## (undated) DEVICE — SYR LL 3CC

## (undated) DEVICE — FRCP BX RADJAW4 NDL 2.8 240CM LG OG BX40

## (undated) DEVICE — THE BITE BLOCK MAXI, LATEX FREE STRAP IS USED TO PROTECT THE ENDOSCOPE INSERTION TUBE FROM BEING BITTEN BY THE PATIENT.

## (undated) DEVICE — LAB CORP AGAR SLANT UREA PK/10